# Patient Record
Sex: MALE | Race: WHITE | URBAN - METROPOLITAN AREA
[De-identification: names, ages, dates, MRNs, and addresses within clinical notes are randomized per-mention and may not be internally consistent; named-entity substitution may affect disease eponyms.]

---

## 2024-07-05 ENCOUNTER — OFFICE VISIT (OUTPATIENT)
Age: 44
End: 2024-07-05

## 2024-07-05 VITALS
TEMPERATURE: 98.1 F | HEIGHT: 75 IN | HEART RATE: 105 BPM | BODY MASS INDEX: 28.97 KG/M2 | SYSTOLIC BLOOD PRESSURE: 112 MMHG | WEIGHT: 233 LBS | DIASTOLIC BLOOD PRESSURE: 70 MMHG | OXYGEN SATURATION: 95 %

## 2024-07-05 DIAGNOSIS — M54.50 ACUTE MIDLINE LOW BACK PAIN WITHOUT SCIATICA: Primary | ICD-10-CM

## 2024-07-05 DIAGNOSIS — R03.0 ELEVATED BLOOD PRESSURE READING IN OFFICE WITHOUT DIAGNOSIS OF HYPERTENSION: ICD-10-CM

## 2024-07-05 RX ORDER — TIZANIDINE 4 MG/1
4 TABLET ORAL EVERY 8 HOURS PRN
Qty: 30 TABLET | Refills: 0 | Status: SHIPPED | OUTPATIENT
Start: 2024-07-05 | End: 2024-07-15

## 2024-07-05 RX ORDER — QUETIAPINE FUMARATE 25 MG/1
25 TABLET, FILM COATED ORAL 2 TIMES DAILY
COMMUNITY

## 2024-07-05 RX ORDER — ACETAMINOPHEN 500 MG
1000 TABLET ORAL EVERY 8 HOURS PRN
Qty: 60 TABLET | Refills: 0 | Status: SHIPPED | OUTPATIENT
Start: 2024-07-05 | End: 2024-07-15

## 2024-07-05 RX ORDER — IBUPROFEN 600 MG/1
600 TABLET ORAL EVERY 8 HOURS PRN
Qty: 30 TABLET | Refills: 0 | Status: SHIPPED | OUTPATIENT
Start: 2024-07-05 | End: 2024-07-15

## 2024-07-05 ASSESSMENT — ENCOUNTER SYMPTOMS
BOWEL INCONTINENCE: 0
ABDOMINAL PAIN: 0
BACK PAIN: 1

## 2024-07-05 NOTE — PROGRESS NOTES
Thierno Del Rosario (:  1980) is a 44 y.o. male,New patient, here for evaluation of the following chief complaint(s):  Back Pain (Midline low back pain x8 days, NKI)      ASSESSMENT/PLAN:    ICD-10-CM    1. Acute midline low back pain without sciatica  M54.50 External Referral To Primary Care     tiZANidine (ZANAFLEX) 4 MG tablet     ibuprofen (ADVIL;MOTRIN) 600 MG tablet     acetaminophen (TYLENOL) 500 MG tablet      2. Elevated blood pressure reading in office without diagnosis of hypertension  R03.0 External Referral To Primary Care        Back pain-Tizanidine, ibuprofen,acetaminophen as needed for pain and muscle spasms. May use ice for 20-30 minutes 3-4 times per day.   Elevated Blood pressure without a diagnosis of hypertension-Referred back to PCP, sandra arvizu, active.     Patient verbalized understanding of printed and verbal discharge instructions including follow up care.      Follow up in 7 days if symptoms persist or if symptoms worsen.    SUBJECTIVE/OBJECTIVE:  Has been taking his mom's cyclobenzaprine and acetaminophen without relief. Denies injury. Recently purchased a new mattress.       History provided by:  Patient  Back Pain  This is a new problem. The current episode started in the past 7 days. The problem occurs constantly. The problem is unchanged. The pain is present in the lumbar spine. The pain does not radiate. The pain is moderate. The symptoms are aggravated by bending, twisting and standing. Pertinent negatives include no abdominal pain, bladder incontinence, bowel incontinence, leg pain or numbness. He has tried muscle relaxant for the symptoms.           Vitals:    24 1727 24 1737   BP: (!) 131/93 112/70   Site: Right Upper Arm Right Upper Arm   Position: Sitting Sitting   Cuff Size: Large Adult Large Adult   Pulse: (!) 105    Temp: 98.1 °F (36.7 °C)    TempSrc: Oral    SpO2: 95%    Weight: 105.7 kg (233 lb)    Height: 1.905 m (6' 3\")        Review of Systems